# Patient Record
Sex: FEMALE | Race: BLACK OR AFRICAN AMERICAN | NOT HISPANIC OR LATINO | Employment: FULL TIME | URBAN - METROPOLITAN AREA
[De-identification: names, ages, dates, MRNs, and addresses within clinical notes are randomized per-mention and may not be internally consistent; named-entity substitution may affect disease eponyms.]

---

## 2018-10-23 ENCOUNTER — OFFICE VISIT (OUTPATIENT)
Dept: OTOLARYNGOLOGY | Facility: CLINIC | Age: 48
End: 2018-10-23
Payer: COMMERCIAL

## 2018-10-23 VITALS
SYSTOLIC BLOOD PRESSURE: 150 MMHG | WEIGHT: 185.19 LBS | BODY MASS INDEX: 29.89 KG/M2 | DIASTOLIC BLOOD PRESSURE: 92 MMHG | HEART RATE: 81 BPM

## 2018-10-23 DIAGNOSIS — H61.23 HEARING LOSS DUE TO CERUMEN IMPACTION, BILATERAL: Primary | ICD-10-CM

## 2018-10-23 PROCEDURE — 3008F BODY MASS INDEX DOCD: CPT | Mod: CPTII,S$GLB,, | Performed by: OTOLARYNGOLOGY

## 2018-10-23 PROCEDURE — 99201 PR OFFICE/OUTPT VISIT,NEW,LEVL I: CPT | Mod: 25,S$GLB,, | Performed by: OTOLARYNGOLOGY

## 2018-10-23 PROCEDURE — 99999 PR PBB SHADOW E&M-EST. PATIENT-LVL III: CPT | Mod: PBBFAC,,, | Performed by: OTOLARYNGOLOGY

## 2018-10-23 PROCEDURE — 69210 REMOVE IMPACTED EAR WAX UNI: CPT | Mod: S$GLB,,, | Performed by: OTOLARYNGOLOGY

## 2018-10-23 NOTE — PROGRESS NOTES
"Subjective:       Patient ID: Marilyn Gavin is a 47 y.o. female.    Chief Complaint: Cerumen Impaction    HPI: Ms. Gavin is a 47 year old AAF who obtained a 's license recently ( to maintain her job status) despite the fact that she could not hear well due to a cerumen impaction problem affecting both ears.    The problem almost caused her to fail her physical exam.  She passed the "whisper test", fortunately.  Hand written reason the visit today is ear cleaning She indicates the need for an ear cleaning procedure presently.  She indicates pressure sensation in her ears consistent with cerumen impactions.  She had been evaluated in the ED in December 2015 for near syncopal episode occurred when she got out to help the patient going to the hospital; she felt dizzy and lightheaded and fell to her knees for a few seconds without loss of consciousness.  She was diagnosed with orthostasis and near-syncope then.  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    PMH:  PSH:  Caesarean section procedure  Family history:  Diabetes  Allergies:  Penicillins  Habits:  1 caffeinated drink per day  Occupation:    Review of Systems   Ears: Positive for ear pain, ringing in ear and dizziness.    Mouth/Throat: Positive for impaired swallowing.   Constitutional: Positive for recent unexplained weight loss, fever, chills and night sweats.    Gastrointestinal:  Positive for acid reflux.   Other:  Positive for kidney problem, bladder problem, depression, anxiety, heat intolerance, cold intolerance and anemia. Negative for rash.           Objective:     Blood pressure 150/92 pulse 80 height 5 ft 6 in weight 1 lb  General:  Alert and oriented lady in no acute distress  Both ears were examined under the microscope in micro procedure room  A large occlusive cerumen impaction is carefully extracted from the ear canal in pieces with an angled pick.  An even larger occlusive cerumen impactions extracted from the entire length of " the left ear canal in pieces an angle pick.  Her hearing is improved after the procedures.    Physical Exam   Constitutional: She is oriented to person, place, and time. She appears well-developed and well-nourished.   HENT:   Head: Normocephalic.   Right Ear: Tympanic membrane and external ear normal. No drainage. No foreign bodies. No mastoid tenderness. Tympanic membrane is not perforated. No decreased hearing is noted.   Left Ear: Tympanic membrane and external ear normal. No drainage. No foreign bodies. No mastoid tenderness. Tympanic membrane is not perforated. No decreased hearing is noted.   Ears:    Nose: Nose normal. No nasal deformity, septal deviation or nasal septal hematoma. No epistaxis. Right sinus exhibits no maxillary sinus tenderness and no frontal sinus tenderness. Left sinus exhibits no maxillary sinus tenderness and no frontal sinus tenderness.   Mouth/Throat: Uvula is midline, oropharynx is clear and moist and mucous membranes are normal. No oral lesions. No trismus in the jaw. No uvula swelling. No oropharyngeal exudate or tonsillar abscesses.   Neck: Neck supple. No tracheal deviation present. No thyromegaly present.   Pulmonary/Chest: Effort normal. No stridor.   Lymphadenopathy:     She has no cervical adenopathy.   Neurological: She is alert and oriented to person, place, and time.   Skin: No rash noted.       Assessment:       1. Hearing loss due to cerumen impaction, bilateral        Plan:     Large cerumen impactions removed from both occlude eacs with angled pick  Audiometry offered/deferred per patient  Yearly ear cleaning recommended

## 2018-10-23 NOTE — PATIENT INSTRUCTIONS
Large cerumen impactions removed from both occlude eacs with angled pick  Audiometry offered/deferred per patient  Yearly ear cleaning recommended

## 2018-10-23 NOTE — LETTER
October 24, 2018      Shahida Marrero MD  [address]  [facility] [address]           [facility] Otorhinol[facility]ology  [address]  [facility] [address]  Phone: [phone]  Fax: [phone]          Patient: Marilyn Gavin   MR Number: [id number]   YOB: 1970   Date of Visit: 10/23/2018       Dear Dr. Shahida Marrero:    Thank you for referring Marilyn Gavin to me for evaluation. Attached you will find relevant portions of my assessment and plan of care.    If you have questions, please do not hesitate to call me. I look forward to following Marilyn Gavin along with you.    Sincerely,    Axel Campuzano III, MD    Enclosure  CC:  No Recipients    If you would like to receive this communication electronically, please contact [email or web address] or [phone] to request more information on [facility] access.    For providers and/or their staff who would like to refer a patient to [facility], please contact us through our one-stop-shop provider referral line, [facility], at [phone].    If you feel you have received this communication in error or would no longer like to receive these types of communications, please e-mail [email or web address]

## 2019-09-16 ENCOUNTER — HOSPITAL ENCOUNTER (EMERGENCY)
Facility: OTHER | Age: 49
Discharge: HOME | End: 2019-09-16
Attending: EMERGENCY MEDICINE
Payer: COMMERCIAL

## 2019-09-16 VITALS
DIASTOLIC BLOOD PRESSURE: 83 MMHG | SYSTOLIC BLOOD PRESSURE: 133 MMHG | HEART RATE: 62 BPM | HEIGHT: 67 IN | RESPIRATION RATE: 18 BRPM | BODY MASS INDEX: 28.25 KG/M2 | TEMPERATURE: 97 F | WEIGHT: 180 LBS | OXYGEN SATURATION: 100 %

## 2019-09-16 DIAGNOSIS — N63.20 LEFT BREAST MASS: ICD-10-CM

## 2019-09-16 DIAGNOSIS — N64.4 BREAST PAIN, LEFT: Primary | ICD-10-CM

## 2019-09-16 PROCEDURE — 99283 EMERGENCY DEPT VISIT LOW MDM: CPT

## 2019-09-16 RX ORDER — IBUPROFEN 600 MG/1
600 TABLET ORAL EVERY 6 HOURS PRN
Qty: 20 TABLET | Refills: 0 | Status: SHIPPED | OUTPATIENT
Start: 2019-09-16 | End: 2019-09-16 | Stop reason: CLARIF

## 2019-09-16 RX ORDER — IBUPROFEN 600 MG/1
600 TABLET ORAL EVERY 6 HOURS PRN
Qty: 20 TABLET | Refills: 0 | OUTPATIENT
Start: 2019-09-16 | End: 2019-10-30

## 2019-09-16 NOTE — ED PROVIDER NOTES
Encounter Date: 2019       History     Chief Complaint   Patient presents with    Breast Mass     Pt reports painful lump with no redness or drainage to the left breast since this am     48-year-old female with history of fibroids, tubal ligation and back pain presents emergency department with complaints of left breast mass with pain. She states that she noticed it this morning.  She denies trauma or injury, redness or warmth, fever chills. Denies nipple discharge. She states that she last saw her OBGYN 1 year ago and last had a mammogram over 2 years ago.  No current treatment for her symptoms    The history is provided by the patient and the spouse.     Review of patient's allergies indicates:   Allergen Reactions    Penicillins Itching     Past Medical History:   Diagnosis Date    Back pain     Fibroid     Hx of tubal ligation      Past Surgical History:   Procedure Laterality Date     SECTION, CLASSIC      TUBAL LIGATION       Family History   Problem Relation Age of Onset    Colon cancer Maternal Grandmother     Cancer Paternal Grandmother     Diabetes Mellitus Maternal Grandfather     Hypertension Neg Hx     Diabetes Neg Hx     Blindness Neg Hx     Macular degeneration Neg Hx     Retinal detachment Neg Hx     Glaucoma Neg Hx      Social History     Tobacco Use    Smoking status: Never Smoker    Smokeless tobacco: Never Used   Substance Use Topics    Alcohol use: No     Comment: occasional wine    Drug use: No     Review of Systems   Constitutional: Negative for chills and fever.   Respiratory: Negative for shortness of breath.    Cardiovascular: Negative for chest pain.   Musculoskeletal: Negative for back pain.   Skin: Negative for color change, rash and wound.        Left breast pain and mass   Neurological: Negative for weakness and numbness.   Hematological: Does not bruise/bleed easily.       Physical Exam     Initial Vitals [19 1342]   BP Pulse Resp Temp  SpO2   133/83 62 18 97 °F (36.1 °C) 100 %      MAP       --         Physical Exam    Nursing note and vitals reviewed.  Constitutional: Vital signs are normal. She appears well-developed and well-nourished. She is not diaphoretic.  Non-toxic appearance. No distress.   HENT:   Head: Normocephalic and atraumatic.   Right Ear: External ear normal.   Left Ear: External ear normal.   Nose: Nose normal.   Eyes: Conjunctivae and lids are normal. No scleral icterus.   Neck: Phonation normal.   Cardiovascular: Normal rate, regular rhythm and normal heart sounds.   Pulmonary/Chest: She is in respiratory distress. She has no wheezes. She has no rhonchi. She has no rales. Left breast exhibits tenderness. Left breast exhibits no inverted nipple, no nipple discharge and no skin change.       TTP to the left lateral breast with possible soft mobile mass. No erythema, warmth, edema, induration, fluctuance, rash   Abdominal: Normal appearance.   Musculoskeletal: Normal range of motion.   No obvious deformities, moving all extremities, normal gait   Neurological: She is alert and oriented to person, place, and time. No sensory deficit.   Skin: Skin is warm, dry and intact. No lesion, no rash and no abscess noted. No erythema.   Psychiatric: She has a normal mood and affect. Her speech is normal and behavior is normal. Judgment normal. Cognition and memory are normal.         ED Course   Procedures  Labs Reviewed - No data to display       Imaging Results    None          Medical Decision Making:   History:   I obtained history from: someone other than patient.       <> Summary of History: Spouse  Old Medical Records: I decided to obtain old medical records.  Initial Assessment:   48-year-old female with complaints consistent with left breast mass and pain. Vital signs stable, afebrile, neurovascularly intact.  She is alert and healthy and nontoxic appearing.  She is not distressed.  Exam documented above.  No evidence of abscess or  "cellulitis.  ED Management:  Recommend outpatient mammogram.  I do not feel that emergent ultrasound is indicated.  Will treat with NSAIDs and urged warm compresses.  She is urged to follow up in the next 48 hr with OBGYN or return to emergency department for any worsening signs or symptoms.  Given return precautions.  She states understanding agrees this plan.  This is the extent of patient's complaints today.  This patient was discussed with the attending physician who agrees with treatment plan.  Other:   I have discussed this case with another health care provider.       <> Summary of the Discussion: [place]  This note was created using [employer] dictation.  There may be typographical errors secondary to dictation.                     ED Course as of Sep 16 1437   Mon Sep 16, 2019   1418 Marilyn Gavin, 48 y.o.  presented to the ED with c/o left breast "knot." reports associated pain. Denies drainage, redness or fever. Reports has not seen GYN in over a year. Last mammo was 2 years ago. +TTP to lateral aspect of left breast. No overlying erythema or warmth.     Patient seen and medically screened by the Provider in Triage due to ED crowding.  Appropriate tests and/or medications ordered.  A medical screening exam has been performed.  The care will be assumed by myself or another provider when treatment space becomes available.  I am not assuming care of this patient at this time. 2:18 PM. GLEN RICARDO        [FC]      ED Course User Index  [FC] Isis Harvey PA-C     Clinical Impression:     1. Breast pain, left    2. Left breast mass            Disposition:   Disposition: Discharged  Condition: Stable                        Isis Harvey PA-C  09/16/19 1441    "

## 2019-09-16 NOTE — ED NOTES
Pt reports pain ful knot to left breast that she noticed this AM. She denies any redness or discharge from nipple, denies fever. Pt is AAOx  3, answers questions appropriately

## 2019-09-16 NOTE — DISCHARGE INSTRUCTIONS
Apply warm compresses and take antiinflammatories as prescribed. Return to the ER for worsening symptoms, otherwise follow up with your OBGYN.

## 2019-10-30 ENCOUNTER — HOSPITAL ENCOUNTER (EMERGENCY)
Facility: OTHER | Age: 49
Discharge: HOME | End: 2019-10-30
Attending: EMERGENCY MEDICINE
Payer: COMMERCIAL

## 2019-10-30 VITALS
WEIGHT: 180 LBS | TEMPERATURE: 98 F | OXYGEN SATURATION: 99 % | HEART RATE: 82 BPM | SYSTOLIC BLOOD PRESSURE: 136 MMHG | RESPIRATION RATE: 16 BRPM | DIASTOLIC BLOOD PRESSURE: 85 MMHG | HEIGHT: 67 IN | BODY MASS INDEX: 28.25 KG/M2

## 2019-10-30 DIAGNOSIS — M54.9 UPPER BACK PAIN ON LEFT SIDE: ICD-10-CM

## 2019-10-30 DIAGNOSIS — H93.12 TINNITUS OF LEFT EAR: Primary | ICD-10-CM

## 2019-10-30 DIAGNOSIS — R51.9 ACUTE NONINTRACTABLE HEADACHE, UNSPECIFIED HEADACHE TYPE: ICD-10-CM
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
## 2019-10-30 DIAGNOSIS — M25.512 ACUTE PAIN OF LEFT SHOULDER: ICD-10-CM

## 2019-10-30 DIAGNOSIS — M25.572 ACUTE LEFT ANKLE PAIN: ICD-10-CM

## 2019-10-30 DIAGNOSIS — M25.552 LEFT HIP PAIN: ICD-10-CM

## 2019-10-30 DIAGNOSIS — M54.2 NECK PAIN ON LEFT SIDE: ICD-10-CM

## 2019-10-30 PROCEDURE — 99284 EMERGENCY DEPT VISIT MOD MDM: CPT | Mod: 25

## 2019-10-30 PROCEDURE — 25000003 PHARM REV CODE 250: Performed by: PHYSICIAN ASSISTANT

## 2019-10-30 RX ORDER — ORPHENADRINE CITRATE 100 MG/1
100 TABLET, EXTENDED RELEASE ORAL
Status: COMPLETED | OUTPATIENT
Start: 2019-10-30 | End: 2019-10-30

## 2019-10-30 RX ORDER — ORPHENADRINE CITRATE 100 MG/1
100 TABLET, EXTENDED RELEASE ORAL 2 TIMES DAILY
Qty: 20 TABLET | Refills: 0 | Status: SHIPPED | OUTPATIENT
Start: 2019-10-30 | End: 2019-11-09

## 2019-10-30 RX ORDER — NAPROXEN 500 MG/1
500 TABLET ORAL 2 TIMES DAILY PRN
Qty: 20 TABLET | Refills: 0 | Status: SHIPPED | OUTPATIENT
Start: 2019-10-30

## 2019-10-30 RX ORDER — KETOROLAC TROMETHAMINE 30 MG/ML
30 INJECTION, SOLUTION INTRAMUSCULAR; INTRAVENOUS
Status: DISCONTINUED | OUTPATIENT
Start: 2019-10-30 | End: 2019-10-30 | Stop reason: HOSPADM

## 2019-10-30 RX ADMIN — ORPHENADRINE CITRATE 100 MG: 100 TABLET, EXTENDED RELEASE ORAL at 04:10
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
## 2021-04-16 ENCOUNTER — PATIENT MESSAGE (OUTPATIENT)
Dept: RESEARCH | Facility: HOSPITAL | Age: 51
End: 2021-04-16

## 2021-07-18 DIAGNOSIS — U07.1 COVID-19 VIRUS DETECTED: ICD-10-CM

## 2021-07-24 ENCOUNTER — NURSE TRIAGE (OUTPATIENT)
Dept: ADMINISTRATIVE | Facility: CLINIC | Age: 51
End: 2021-07-24

## 2022-12-26 PROBLEM — Z11.52 ENCOUNTER FOR SCREENING FOR COVID-19: Status: ACTIVE | Noted: 2022-12-26
